# Patient Record
Sex: FEMALE | Race: BLACK OR AFRICAN AMERICAN | NOT HISPANIC OR LATINO
[De-identification: names, ages, dates, MRNs, and addresses within clinical notes are randomized per-mention and may not be internally consistent; named-entity substitution may affect disease eponyms.]

---
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
## 2019-04-04 ENCOUNTER — WALK IN (OUTPATIENT)
Dept: URGENT CARE | Age: 19
End: 2019-04-04

## 2019-04-04 VITALS
RESPIRATION RATE: 16 BRPM | HEART RATE: 60 BPM | TEMPERATURE: 97.4 F | SYSTOLIC BLOOD PRESSURE: 116 MMHG | DIASTOLIC BLOOD PRESSURE: 70 MMHG

## 2019-04-04 DIAGNOSIS — H92.03 OTALGIA OF BOTH EARS: Primary | ICD-10-CM

## 2019-04-04 PROBLEM — H92.09 EAR PAIN: Status: ACTIVE | Noted: 2019-04-04

## 2019-04-04 PROCEDURE — 99202 OFFICE O/P NEW SF 15 MIN: CPT | Performed by: NURSE PRACTITIONER

## 2019-04-04 ASSESSMENT — ENCOUNTER SYMPTOMS
CONSTITUTIONAL NEGATIVE: 1
EYES NEGATIVE: 1
RESPIRATORY NEGATIVE: 1

## 2019-06-14 PROCEDURE — 86803 HEPATITIS C AB TEST: CPT | Performed by: OBSTETRICS & GYNECOLOGY

## 2019-06-14 PROCEDURE — 87389 HIV-1 AG W/HIV-1&-2 AB AG IA: CPT | Performed by: OBSTETRICS & GYNECOLOGY

## 2020-12-02 ENCOUNTER — HOSPITAL ENCOUNTER (OUTPATIENT)
Age: 20
Discharge: HOME | End: 2020-12-02
Payer: COMMERCIAL

## 2020-12-02 VITALS
HEART RATE: 82 BPM | OXYGEN SATURATION: 100 % | TEMPERATURE: 98 F | RESPIRATION RATE: 16 BRPM | DIASTOLIC BLOOD PRESSURE: 77 MMHG | SYSTOLIC BLOOD PRESSURE: 124 MMHG

## 2020-12-02 DIAGNOSIS — Z20.822 SUSPECTED COVID-19 VIRUS INFECTION: ICD-10-CM

## 2020-12-02 DIAGNOSIS — B34.9 VIRAL SYNDROME: Primary | ICD-10-CM

## 2020-12-02 PROCEDURE — 99213 OFFICE O/P EST LOW 20 MIN: CPT | Performed by: NURSE PRACTITIONER

## 2020-12-02 PROCEDURE — 81002 URINALYSIS NONAUTO W/O SCOPE: CPT | Performed by: NURSE PRACTITIONER

## 2020-12-02 PROCEDURE — 81025 URINE PREGNANCY TEST: CPT | Performed by: NURSE PRACTITIONER

## 2020-12-02 RX ORDER — ONDANSETRON 4 MG/1
4 TABLET, ORALLY DISINTEGRATING ORAL EVERY 4 HOURS PRN
Qty: 10 TABLET | Refills: 0 | Status: SHIPPED | OUTPATIENT
Start: 2020-12-02 | End: 2020-12-09

## 2020-12-02 RX ORDER — ONDANSETRON 4 MG/1
4 TABLET, ORALLY DISINTEGRATING ORAL ONCE
Status: COMPLETED | OUTPATIENT
Start: 2020-12-02 | End: 2020-12-02

## 2020-12-02 NOTE — ED PROVIDER NOTES
Patient Seen in: [facility]      History   Patient presents with:  Nausea/Vomiting/Diarrhea    Stated Complaint: vomiting dizzy    25-year-old female presents today for congestion, sore throat body aches, dizziness nausea with 2 episodes of vo regular rhythm. Pulmonary:      Effort: Pulmonary effort is normal.      Breath sounds: Normal breath sounds. Abdominal:      General: Bowel sounds are normal. There is no distension. Palpations: Abdomen is soft. Tenderness:  There is abdomina Prescribed:  Current Discharge Medication List    START taking these medications    ondansetron 4 MG Oral Tablet Dispersible  Take 1 tablet (4 mg total) by mouth every 4 (four) hours as needed for Nausea.   Qty: 10 tablet Refills: 0

## 2020-12-02 NOTE — ED INITIAL ASSESSMENT (HPI)
Pt sts last night began with n/v, dizzy, chills, sore throat. Denies fever or diarrhea, abd pain or urinary symptoms.

## 2020-12-07 ENCOUNTER — TELEPHONE (OUTPATIENT)
Dept: BEHAVIORAL HEALTH | Age: 20
End: 2020-12-07

## 2021-01-15 ENCOUNTER — V-VISIT (OUTPATIENT)
Dept: BEHAVIORAL HEALTH | Age: 21
End: 2021-01-15

## 2021-01-15 DIAGNOSIS — F43.29 ADJUSTMENT DISORDER WITH MIXED EMOTIONAL FEATURES: Primary | ICD-10-CM

## 2021-01-15 PROCEDURE — 90791 PSYCH DIAGNOSTIC EVALUATION: CPT | Performed by: SOCIAL WORKER

## 2021-02-11 ENCOUNTER — HOSPITAL ENCOUNTER (OUTPATIENT)
Age: 21
Discharge: HOME | End: 2021-02-11
Payer: COMMERCIAL

## 2021-02-11 ENCOUNTER — APPOINTMENT (OUTPATIENT)
Dept: GENERAL RADIOLOGY | Age: 21
End: 2021-02-11
Attending: NURSE PRACTITIONER
Payer: COMMERCIAL

## 2021-02-11 VITALS
RESPIRATION RATE: 16 BRPM | HEART RATE: 72 BPM | DIASTOLIC BLOOD PRESSURE: 78 MMHG | SYSTOLIC BLOOD PRESSURE: 128 MMHG | OXYGEN SATURATION: 100 % | TEMPERATURE: 98 F

## 2021-02-11 DIAGNOSIS — Z20.822 ENCOUNTER FOR LABORATORY TESTING FOR COVID-19 VIRUS: Primary | ICD-10-CM

## 2021-02-11 DIAGNOSIS — J45.21 MILD INTERMITTENT ASTHMA WITH EXACERBATION: ICD-10-CM

## 2021-02-11 DIAGNOSIS — J06.9 VIRAL UPPER RESPIRATORY ILLNESS: ICD-10-CM

## 2021-02-11 LAB — SARS-COV-2 RNA RESP QL NAA+PROBE: NOT DETECTED

## 2021-02-11 PROCEDURE — U0002 COVID-19 LAB TEST NON-CDC: HCPCS | Performed by: NURSE PRACTITIONER

## 2021-02-11 PROCEDURE — 71046 X-RAY EXAM CHEST 2 VIEWS: CPT | Performed by: NURSE PRACTITIONER

## 2021-02-11 PROCEDURE — 99212 OFFICE O/P EST SF 10 MIN: CPT | Performed by: NURSE PRACTITIONER

## 2021-02-11 RX ORDER — PREDNISONE 20 MG/1
40 TABLET ORAL DAILY
Qty: 10 TABLET | Refills: 0 | Status: SHIPPED | OUTPATIENT
Start: 2021-02-11 | End: 2021-02-16

## 2021-02-11 NOTE — ED PROVIDER NOTES
Patient Seen in: [facility]      History   Patient presents with:  Cough/URI    Stated Complaint: cough, chest pain    HPI/Subjective:   21-year-old female presents today with complaints of URI symptoms and a persistent cough with history of s edema, congestion and rhinorrhea present. Mouth/Throat:      Mouth: Mucous membranes are moist.      Pharynx: Uvula midline. Posterior oropharyngeal erythema present.    Eyes:      Conjunctiva/sclera: Conjunctivae normal.      Pupils: Pupils are equal, pushing fluids rest.  Alternate Tylenol and Motrin for any fever pain. Encouraged take over-the-counter antihistamine and cough suppressant as needed. Will give prescription for prednisone to help with patient's asthma exacerbation.   Patient does have al

## 2021-06-22 PROBLEM — R42 DIZZINESS: Status: ACTIVE | Noted: 2021-06-22

## 2021-06-22 PROBLEM — M54.2 CERVICALGIA: Status: ACTIVE | Noted: 2021-06-22

## 2021-06-22 PROBLEM — R20.2 ARM PARESTHESIA, LEFT: Status: ACTIVE | Noted: 2021-06-22

## 2021-09-15 PROBLEM — F41.8 OTHER SPECIFIED ANXIETY DISORDERS: Status: ACTIVE | Noted: 2021-09-15

## 2021-09-15 PROBLEM — F33.1 MDD (MAJOR DEPRESSIVE DISORDER), RECURRENT EPISODE, MODERATE (HCC): Status: ACTIVE | Noted: 2021-09-15

## 2021-11-03 ENCOUNTER — APPOINTMENT (OUTPATIENT)
Dept: CT IMAGING | Age: 21
End: 2021-11-03
Attending: NURSE PRACTITIONER
Payer: COMMERCIAL

## 2021-11-03 ENCOUNTER — HOSPITAL ENCOUNTER (OUTPATIENT)
Age: 21
Discharge: HOME | End: 2021-11-03
Payer: COMMERCIAL

## 2021-11-03 VITALS
WEIGHT: 190 LBS | TEMPERATURE: 98 F | SYSTOLIC BLOOD PRESSURE: 119 MMHG | OXYGEN SATURATION: 100 % | HEART RATE: 70 BPM | RESPIRATION RATE: 18 BRPM | BODY MASS INDEX: 32.44 KG/M2 | DIASTOLIC BLOOD PRESSURE: 75 MMHG | HEIGHT: 64 IN

## 2021-11-03 DIAGNOSIS — S09.90XA INJURY OF HEAD, INITIAL ENCOUNTER: Primary | ICD-10-CM

## 2021-11-03 PROCEDURE — 99214 OFFICE O/P EST MOD 30 MIN: CPT | Performed by: NURSE PRACTITIONER

## 2021-11-03 PROCEDURE — 70450 CT HEAD/BRAIN W/O DYE: CPT | Performed by: NURSE PRACTITIONER

## 2021-11-03 PROCEDURE — 72125 CT NECK SPINE W/O DYE: CPT | Performed by: NURSE PRACTITIONER

## 2021-11-03 PROCEDURE — 81025 URINE PREGNANCY TEST: CPT | Performed by: NURSE PRACTITIONER

## 2021-11-03 RX ORDER — ONDANSETRON 4 MG/1
4 TABLET, ORALLY DISINTEGRATING ORAL ONCE
Status: COMPLETED | OUTPATIENT
Start: 2021-11-03 | End: 2021-11-03

## 2021-11-03 RX ORDER — ONDANSETRON 4 MG/1
4 TABLET, ORALLY DISINTEGRATING ORAL EVERY 4 HOURS PRN
Qty: 10 TABLET | Refills: 0 | Status: SHIPPED | OUTPATIENT
Start: 2021-11-03 | End: 2021-11-10

## 2021-11-03 RX ORDER — MECLIZINE HYDROCHLORIDE 25 MG/1
25 TABLET ORAL 3 TIMES DAILY PRN
Qty: 21 TABLET | Refills: 0 | Status: SHIPPED | OUTPATIENT
Start: 2021-11-03 | End: 2021-11-05 | Stop reason: ALTCHOICE

## 2021-11-03 NOTE — ED INITIAL ASSESSMENT (HPI)
Patient's dog tripped her on the stairs at her home. She fell back on hit her head on the wood steps- falling down about 5 steps. No LOC but has been sleepy, nauseated, and dizzy since the fall.  She has a history of 2 previous concussions- last one was abo

## 2021-11-03 NOTE — ED PROVIDER NOTES
Patient Seen in: [facility] [place]      History   Patient presents with:  Fall  Head Injury  Dizziness  Nausea    Stated Complaint: fall-head injury    Subjective:   24-year-old female complaining of nausea, dizziness, neck pain with movement ever si (36.6 °C) (Temporal)   Resp 18   Ht 162.6 cm (5' 4\")   Wt 86.2 kg   LMP 09/16/2021   SpO2 100%   BMI 32.61 kg/m²         Physical Exam  Vitals and nursing note reviewed. Constitutional:       General: She is not in acute distress.      Appearance: She is focal or neurological weakness other than what I mentioned. We did a CT of the head and C-spine which were negative. She presents with no other red flag headache symptoms. Symptoms most consistent with closed head injury with concussive-like symptoms.

## 2021-11-05 ENCOUNTER — OFFICE VISIT (OUTPATIENT)
Dept: FAMILY MEDICINE CLINIC | Facility: CLINIC | Age: 21
End: 2021-11-05
Payer: COMMERCIAL

## 2021-11-05 VITALS — OXYGEN SATURATION: 98 % | WEIGHT: 194 LBS | HEIGHT: 64 IN | RESPIRATION RATE: 18 BRPM | BODY MASS INDEX: 33.12 KG/M2

## 2021-11-05 DIAGNOSIS — Z76.89 ENCOUNTER TO ESTABLISH CARE: ICD-10-CM

## 2021-11-05 DIAGNOSIS — S06.0X0A CONCUSSION WITHOUT LOSS OF CONSCIOUSNESS, INITIAL ENCOUNTER: Primary | ICD-10-CM

## 2021-11-05 PROCEDURE — 99203 OFFICE O/P NEW LOW 30 MIN: CPT | Performed by: FAMILY MEDICINE

## 2021-11-05 PROCEDURE — 3008F BODY MASS INDEX DOCD: CPT | Performed by: FAMILY MEDICINE

## 2021-11-05 NOTE — PATIENT INSTRUCTIONS
Concussion    A concussion is a type of brain injury. It can be caused by a direct hit or blow to the head, neck, face, or body.  The force of the blow makes the head and brain shake quickly back and forth. In some cases you may lose consciousness. Depend (chronic) liver or kidney disease, talk with your healthcare provider before using these medicines. Also talk with your provider if you ever had a stomach ulcer or gastrointestinal bleeding.   · For the next 24 hours:  ? Don’t drink alcohol or take sedative information is not intended as a substitute for professional medical care. Always follow your healthcare professional's instructions.

## 2021-11-05 NOTE — PROGRESS NOTES
Patient presents with:  Concussion: Pt fell down the stairs. sx dizzy,light and sound sensitivityand blurry vision       HPI:    Patient ID: Noy Youngblood is a 24year old female. HPI   Patient here for concussion.  She fell on Wednesday and had a c Grandfather    • Diabetes Paternal Grandfather    • Hypertension Sister    • Hypertension Brother       Social History: Social History    Tobacco Use      Smoking status: Never Smoker      Smokeless tobacco: Never Used    Vaping Use      Vaping Use: Never

## 2021-11-12 ENCOUNTER — OFFICE VISIT (OUTPATIENT)
Dept: FAMILY MEDICINE CLINIC | Facility: CLINIC | Age: 21
End: 2021-11-12
Payer: COMMERCIAL

## 2021-11-12 VITALS
HEART RATE: 74 BPM | WEIGHT: 196 LBS | HEIGHT: 64 IN | OXYGEN SATURATION: 98 % | DIASTOLIC BLOOD PRESSURE: 70 MMHG | TEMPERATURE: 98 F | RESPIRATION RATE: 18 BRPM | SYSTOLIC BLOOD PRESSURE: 110 MMHG | BODY MASS INDEX: 33.46 KG/M2

## 2021-11-12 DIAGNOSIS — S06.0X0A CONCUSSION WITHOUT LOSS OF CONSCIOUSNESS, INITIAL ENCOUNTER: Primary | ICD-10-CM

## 2021-11-12 PROCEDURE — 3078F DIAST BP <80 MM HG: CPT | Performed by: FAMILY MEDICINE

## 2021-11-12 PROCEDURE — 3074F SYST BP LT 130 MM HG: CPT | Performed by: FAMILY MEDICINE

## 2021-11-12 PROCEDURE — 3008F BODY MASS INDEX DOCD: CPT | Performed by: FAMILY MEDICINE

## 2021-11-12 PROCEDURE — 99213 OFFICE O/P EST LOW 20 MIN: CPT | Performed by: FAMILY MEDICINE

## 2021-11-12 RX ORDER — MECLIZINE HYDROCHLORIDE 25 MG/1
25 TABLET ORAL 3 TIMES DAILY PRN
Qty: 30 TABLET | Refills: 0 | Status: SHIPPED | OUTPATIENT
Start: 2021-11-12 | End: 2021-11-22

## 2021-11-12 NOTE — PROGRESS NOTES
Patient presents with:  Concussion: Follow up. Headache is still there, dizzines        HPI:    Patient ID: Sindhu Ervin is a 24year old female. HPI follow up on concussion. She still has dizzines intermittent with bending and movement.  No nause takes less than 2 seconds. Neurological:      General: No focal deficit present. Mental Status: She is alert. ASSESSMENT/PLAN:   1. Concussion without loss of consciousness, initial encounter  Supportive care. Meclizine given.   Work no

## 2021-11-15 ENCOUNTER — TELEPHONE (OUTPATIENT)
Dept: FAMILY MEDICINE CLINIC | Facility: CLINIC | Age: 21
End: 2021-11-15

## 2021-11-15 DIAGNOSIS — S06.0X0A CONCUSSION WITHOUT LOSS OF CONSCIOUSNESS, INITIAL ENCOUNTER: Primary | ICD-10-CM

## 2021-11-15 NOTE — TELEPHONE ENCOUNTER
Patient reports see still feels dizzy, headahe and vision sensitiivity. Referral to neuro placed. If any other symptoms or getting worse go to ic/er.

## 2021-11-15 NOTE — TELEPHONE ENCOUNTER
----- Message from [facility] Staff sent at 11/15/2021 11:59 AM CST -----  Regarding: pt call back  Pt LVM returning call

## 2021-11-17 ENCOUNTER — TELEPHONE (OUTPATIENT)
Dept: FAMILY MEDICINE CLINIC | Facility: CLINIC | Age: 21
End: 2021-11-17

## 2021-11-17 NOTE — TELEPHONE ENCOUNTER
Received Medical Records Request from [facility] [employer]    Ph. [phone]  Fax # [phone]    Sent to [employer]

## 2021-11-22 ENCOUNTER — TELEPHONE (OUTPATIENT)
Dept: FAMILY MEDICINE CLINIC | Facility: CLINIC | Age: 21
End: 2021-11-22

## 2021-11-22 ENCOUNTER — TELEMEDICINE (OUTPATIENT)
Dept: FAMILY MEDICINE CLINIC | Facility: CLINIC | Age: 21
End: 2021-11-22

## 2021-11-22 DIAGNOSIS — S06.0X0A CONCUSSION WITHOUT LOSS OF CONSCIOUSNESS, INITIAL ENCOUNTER: Primary | ICD-10-CM

## 2021-11-22 PROCEDURE — 99213 OFFICE O/P EST LOW 20 MIN: CPT | Performed by: FAMILY MEDICINE

## 2021-11-22 NOTE — TELEPHONE ENCOUNTER
Called pt. She did not see Neuro yet. She has an appt with them on December 6th. VV scheduled.   Future Appointments   Date Time Provider Gisselle Jettisti   11/22/2021  1:40 PM Miguel Ángel Ruiz MD [facility] [place]   12/1/2021  3:00 PM Karly Adame

## 2021-11-22 NOTE — PROGRESS NOTES
No chief complaint on file. follow up  This visit is conducted using Telemedicine with live, interactive video and audio. Patient has been referred to the [facility] website at [email or web address] to review the yearly Consent to Treat document.     Karlie Appearance: She is not ill-appearing. Neurological:      Mental Status: She is alert. Mental status is at baseline. ASSESSMENT/PLAN:   1. Concussion without loss of consciousness, initial encounter  Symptoms not better with meds.  Recommend t

## 2021-11-22 NOTE — TELEPHONE ENCOUNTER
Pt is returning to work today, states she needs a return to work letter   States she needs another letter with updated dates  Pt will stop by the office to .

## 2021-12-06 NOTE — H&P
[facility] New Patient / Consult Visit    Kirsten Watson is a 24year old female.                          Referring MD: Melissa Colunga MD    Patient presents with:  Post-Concussion Syndrome: Referred by PCP, 1 mon ago, ongoing [facility] dizzy spells after her initial injury in 2017 and has never fully recovered but currently is worse than in the past.      Otherwise, patient denies any recent weight change, fevers, chills, nausea, double vision/ blurry vision / loss of vision, chest pain, Ht 64\"   Wt 196 lb (88.9 kg)   LMP 09/16/2021   Breastfeeding No   BMI 33.64 kg/m²   Estimated body mass index is 33.64 kg/m² as calculated from the following:    Height as of this encounter: 64\". Weight as of this encounter: 196 lb (88.9 kg).     GENE bilaterally  Rapid alternating movements normal bilaterally  Heel to shin is normal bilaterally    DTRs:   2+, symmetric, throughout, toes downgoing bilaterally; no clonus          Gait:    Normal casual, heel and toe gait; mildly off balance with casual a head, or rapid eye movements. In addition, she endorses episodes at least 3 times a week where she has sudden onset of worsening dizzy sensation as well as visual aura with palisading lines in her periphery followed by more intense headache and nausea.   Daren Montes reevaluate for improvement at her next visit.   She was advised that she is in the normal range for recovery from postconcussive syndrome symptoms and that symptoms usually improve within 3 months, but recovery is an individualized process, and may be influ

## 2021-12-20 ENCOUNTER — TELEPHONE (OUTPATIENT)
Dept: FAMILY MEDICINE CLINIC | Facility: CLINIC | Age: 21
End: 2021-12-20

## 2021-12-20 NOTE — TELEPHONE ENCOUNTER
Pt saw [employer] on 12/6/21. They wrote a note for pt to be off work until her f/u with them. We completed FMLA forms for her on 11/10/21 which are scanned to the chart. Will await fax from [place].

## 2021-12-20 NOTE — TELEPHONE ENCOUNTER
Hope from [facility] called said we faxed a form to them on the 15th but Dr. Gloria Puga didn't fill out the 3rd page and that is actually the page they need.   Told hope to fax them back for them to be filled

## 2021-12-23 NOTE — TELEPHONE ENCOUNTER
Pt called and VV was scheduled     Future Appointments   Date Time Provider Gisselle Oliverosi   12/27/2021  9:00 AM [facility] [place]   1/4/2022  2:20 PM Vanessa Le MD [facility] [place]   1/5/2022  3:00 PM Pollo Lindsay,

## 2021-12-29 ENCOUNTER — TELEPHONE (OUTPATIENT)
Dept: FAMILY MEDICINE CLINIC | Facility: CLINIC | Age: 21
End: 2021-12-29

## 2021-12-29 NOTE — TELEPHONE ENCOUNTER
I think she needs vv with me to discuss return date and also how she feels. Can you schedule vv for today.

## 2022-02-01 ENCOUNTER — TELEPHONE (OUTPATIENT)
Dept: FAMILY MEDICINE CLINIC | Facility: CLINIC | Age: 22
End: 2022-02-01

## 2022-02-01 NOTE — TELEPHONE ENCOUNTER
Called patient and left a voicemail to call back. We have a form from [employer]. Are question is does she still need the form to be filled out if so patient needs to come in for a appt.

## 2022-02-08 ENCOUNTER — HOSPITAL ENCOUNTER (OUTPATIENT)
Age: 22
Discharge: HOME | End: 2022-02-08
Payer: COMMERCIAL

## 2022-02-08 ENCOUNTER — APPOINTMENT (OUTPATIENT)
Dept: GENERAL RADIOLOGY | Age: 22
End: 2022-02-08
Attending: NURSE PRACTITIONER
Payer: COMMERCIAL

## 2022-02-08 VITALS
WEIGHT: 190 LBS | BODY MASS INDEX: 32.44 KG/M2 | OXYGEN SATURATION: 100 % | DIASTOLIC BLOOD PRESSURE: 77 MMHG | HEART RATE: 77 BPM | RESPIRATION RATE: 16 BRPM | HEIGHT: 64 IN | TEMPERATURE: 98 F | SYSTOLIC BLOOD PRESSURE: 114 MMHG

## 2022-02-08 DIAGNOSIS — R07.89 CHEST PAIN, ATYPICAL: Primary | ICD-10-CM

## 2022-02-08 LAB
#MXD IC: 0.5 X10ˆ3/UL (ref 0.1–1)
ATRIAL RATE: 75 BPM
BUN BLD-MCNC: 5 MG/DL (ref 7–18)
CHLORIDE BLD-SCNC: 104 MMOL/L (ref 98–112)
CO2 BLD-SCNC: 24 MMOL/L (ref 21–32)
DDIMER WHOLE BLOOD: 362 NG/ML DDU (ref ?–400)
GLUCOSE BLD-MCNC: 72 MG/DL (ref 70–99)
HCT VFR BLD AUTO: 36.1 %
HCT VFR BLD CALC: 37 %
HGB BLD-MCNC: 11.8 G/DL
ISTAT IONIZED CALCIUM FOR CHEM 8: 1.28 MMOL/L (ref 1.12–1.32)
LYMPHOCYTES # BLD AUTO: 2.1 X10ˆ3/UL (ref 1–4)
LYMPHOCYTES NFR BLD AUTO: 38.5 %
MCH RBC QN AUTO: 25.9 PG (ref 26–34)
MCV RBC AUTO: 79.3 FL (ref 80–100)
MIXED CELL %: 9.2 %
NEUTROPHILS # BLD AUTO: 2.8 X10ˆ3/UL (ref 1.5–7.7)
NEUTROPHILS NFR BLD AUTO: 52.3 %
P AXIS: 54 DEGREES
P-R INTERVAL: 174 MS
PLATELET # BLD AUTO: 385 X10ˆ3/UL (ref 150–450)
POTASSIUM BLD-SCNC: 3.5 MMOL/L (ref 3.6–5.1)
Q-T INTERVAL: 370 MS
QRS DURATION: 80 MS
QTC CALCULATION (BEZET): 413 MS
RBC # BLD AUTO: 4.55 X10ˆ6/UL
SODIUM BLD-SCNC: 141 MMOL/L (ref 136–145)
T AXIS: 57 DEGREES
TROPONIN I BLD-MCNC: <0.02 NG/ML
VENTRICULAR RATE: 75 BPM
WBC # BLD AUTO: 5.4 X10ˆ3/UL (ref 4–11)

## 2022-02-08 PROCEDURE — 80047 BASIC METABLC PNL IONIZED CA: CPT | Performed by: NURSE PRACTITIONER

## 2022-02-08 PROCEDURE — 71046 X-RAY EXAM CHEST 2 VIEWS: CPT | Performed by: NURSE PRACTITIONER

## 2022-02-08 PROCEDURE — 99214 OFFICE O/P EST MOD 30 MIN: CPT | Performed by: NURSE PRACTITIONER

## 2022-02-08 PROCEDURE — 85378 FIBRIN DEGRADE SEMIQUANT: CPT | Performed by: NURSE PRACTITIONER

## 2022-02-08 PROCEDURE — 84484 ASSAY OF TROPONIN QUANT: CPT | Performed by: NURSE PRACTITIONER

## 2022-02-08 PROCEDURE — 85025 COMPLETE CBC W/AUTO DIFF WBC: CPT | Performed by: NURSE PRACTITIONER

## 2022-02-08 PROCEDURE — 93000 ELECTROCARDIOGRAM COMPLETE: CPT | Performed by: NURSE PRACTITIONER

## 2024-03-21 ENCOUNTER — APPOINTMENT (OUTPATIENT)
Dept: GASTROENTEROLOGY | Age: 24
End: 2024-03-21

## 2024-03-21 ENCOUNTER — LAB SERVICES (OUTPATIENT)
Dept: LAB | Age: 24
End: 2024-03-21

## 2024-03-21 VITALS
HEART RATE: 81 BPM | HEIGHT: 64 IN | BODY MASS INDEX: 27.36 KG/M2 | SYSTOLIC BLOOD PRESSURE: 118 MMHG | TEMPERATURE: 96.8 F | WEIGHT: 160.27 LBS | RESPIRATION RATE: 16 BRPM | DIASTOLIC BLOOD PRESSURE: 77 MMHG | OXYGEN SATURATION: 92 %

## 2024-03-21 DIAGNOSIS — R10.814 LEFT LOWER QUADRANT ABDOMINAL TENDERNESS WITHOUT REBOUND TENDERNESS: ICD-10-CM

## 2024-03-21 DIAGNOSIS — K59.09 CHRONIC CONSTIPATION: ICD-10-CM

## 2024-03-21 DIAGNOSIS — R63.4 UNINTENTIONAL WEIGHT LOSS: ICD-10-CM

## 2024-03-21 DIAGNOSIS — R11.2 NAUSEA AND VOMITING, UNSPECIFIED VOMITING TYPE: ICD-10-CM

## 2024-03-21 DIAGNOSIS — R63.4 UNINTENTIONAL WEIGHT LOSS: Primary | ICD-10-CM

## 2024-03-21 LAB
ALBUMIN SERPL-MCNC: 4 G/DL (ref 3.6–5.1)
ALBUMIN/GLOB SERPL: 1 {RATIO} (ref 1–2.4)
ALP SERPL-CCNC: 56 UNITS/L (ref 45–117)
ALT SERPL-CCNC: 16 UNITS/L
ANION GAP SERPL CALC-SCNC: 14 MMOL/L (ref 7–19)
AST SERPL-CCNC: 14 UNITS/L
BILIRUB SERPL-MCNC: 0.6 MG/DL (ref 0.2–1)
BUN SERPL-MCNC: 11 MG/DL (ref 6–20)
BUN/CREAT SERPL: 14 (ref 7–25)
CALCIUM SERPL-MCNC: 8.8 MG/DL (ref 8.4–10.2)
CHLORIDE SERPL-SCNC: 104 MMOL/L (ref 97–110)
CO2 SERPL-SCNC: 26 MMOL/L (ref 21–32)
CREAT SERPL-MCNC: 0.81 MG/DL (ref 0.51–0.95)
DEPRECATED RDW RBC: 40.7 FL (ref 39–50)
EGFRCR SERPLBLD CKD-EPI 2021: >90 ML/MIN/{1.73_M2}
ERYTHROCYTE [DISTWIDTH] IN BLOOD: 13.7 % (ref 11–15)
FASTING DURATION TIME PATIENT: NORMAL H
GLOBULIN SER-MCNC: 4 G/DL (ref 2–4)
GLUCOSE SERPL-MCNC: 79 MG/DL (ref 70–99)
HBA1C MFR BLD: 5.1 % (ref 4.5–5.6)
HCT VFR BLD CALC: 38.8 % (ref 36–46.5)
HGB BLD-MCNC: 12.9 G/DL (ref 12–15.5)
MCH RBC QN AUTO: 27.2 PG (ref 26–34)
MCHC RBC AUTO-ENTMCNC: 33.2 G/DL (ref 32–36.5)
MCV RBC AUTO: 81.9 FL (ref 78–100)
NRBC BLD MANUAL-RTO: 0 /100 WBC
PLATELET # BLD AUTO: 389 K/MCL (ref 140–450)
POTASSIUM SERPL-SCNC: 3.8 MMOL/L (ref 3.4–5.1)
PROT SERPL-MCNC: 8 G/DL (ref 6.4–8.2)
RBC # BLD: 4.74 MIL/MCL (ref 4–5.2)
SODIUM SERPL-SCNC: 140 MMOL/L (ref 135–145)
TSH SERPL-ACNC: 0.93 MCUNITS/ML (ref 0.35–5)
WBC # BLD: 4.7 K/MCL (ref 4.2–11)

## 2024-03-21 PROCEDURE — 85027 COMPLETE CBC AUTOMATED: CPT | Performed by: INTERNAL MEDICINE

## 2024-03-21 PROCEDURE — 84443 ASSAY THYROID STIM HORMONE: CPT | Performed by: INTERNAL MEDICINE

## 2024-03-21 PROCEDURE — 36415 COLL VENOUS BLD VENIPUNCTURE: CPT | Performed by: NURSE PRACTITIONER

## 2024-03-21 PROCEDURE — 80053 COMPREHEN METABOLIC PANEL: CPT | Performed by: INTERNAL MEDICINE

## 2024-03-21 PROCEDURE — 83036 HEMOGLOBIN GLYCOSYLATED A1C: CPT | Performed by: CLINICAL MEDICAL LABORATORY

## 2024-03-21 RX ORDER — BISACODYL 5 MG/1
TABLET, DELAYED RELEASE ORAL
Qty: 2 TABLET | Refills: 0 | Status: SHIPPED | OUTPATIENT
Start: 2024-03-21

## 2024-03-21 RX ORDER — ONDANSETRON 4 MG/1
4 TABLET, FILM COATED ORAL EVERY 8 HOURS PRN
Qty: 30 TABLET | Refills: 0 | Status: SHIPPED | OUTPATIENT
Start: 2024-03-21 | End: 2024-03-31

## 2024-03-21 ASSESSMENT — ENCOUNTER SYMPTOMS
CONSTIPATION: 1
RECTAL PAIN: 0
PSYCHIATRIC NEGATIVE: 1
CHEST TIGHTNESS: 0
CHOKING: 0
BLOOD IN STOOL: 0
COUGH: 0
ABDOMINAL DISTENTION: 0
ANAL BLEEDING: 0
ABDOMINAL PAIN: 0
DIARRHEA: 0
NAUSEA: 1
SHORTNESS OF BREATH: 0
SORE THROAT: 0
TROUBLE SWALLOWING: 0
UNEXPECTED WEIGHT CHANGE: 1
VOMITING: 1
APPETITE CHANGE: 0
VOICE CHANGE: 0

## 2024-03-30 ENCOUNTER — APPOINTMENT (OUTPATIENT)
Dept: CT IMAGING | Age: 24
End: 2024-03-30
Attending: NURSE PRACTITIONER

## 2024-04-02 ENCOUNTER — TELEPHONE (OUTPATIENT)
Dept: GASTROENTEROLOGY | Age: 24
End: 2024-04-02

## 2024-04-02 ENCOUNTER — E-ADVICE (OUTPATIENT)
Dept: GASTROENTEROLOGY | Age: 24
End: 2024-04-02

## 2024-04-09 ENCOUNTER — APPOINTMENT (OUTPATIENT)
Dept: GASTROENTEROLOGY | Age: 24
End: 2024-04-09

## 2024-04-09 ENCOUNTER — ANESTHESIA EVENT (OUTPATIENT)
Dept: GASTROENTEROLOGY | Age: 24
End: 2024-04-09

## 2024-04-09 ENCOUNTER — ANESTHESIA (OUTPATIENT)
Dept: GASTROENTEROLOGY | Age: 24
End: 2024-04-09

## 2024-04-09 VITALS
DIASTOLIC BLOOD PRESSURE: 84 MMHG | WEIGHT: 160 LBS | TEMPERATURE: 97.3 F | HEIGHT: 64 IN | HEART RATE: 84 BPM | RESPIRATION RATE: 16 BRPM | SYSTOLIC BLOOD PRESSURE: 120 MMHG | OXYGEN SATURATION: 100 % | BODY MASS INDEX: 27.31 KG/M2

## 2024-04-09 DIAGNOSIS — R10.814 LEFT LOWER QUADRANT ABDOMINAL TENDERNESS WITHOUT REBOUND TENDERNESS: ICD-10-CM

## 2024-04-09 DIAGNOSIS — R11.2 NAUSEA AND VOMITING, UNSPECIFIED VOMITING TYPE: ICD-10-CM

## 2024-04-09 DIAGNOSIS — K59.09 CHRONIC CONSTIPATION: ICD-10-CM

## 2024-04-09 DIAGNOSIS — R63.4 UNINTENTIONAL WEIGHT LOSS: Primary | ICD-10-CM

## 2024-04-09 DIAGNOSIS — R20.0 ANESTHESIA: ICD-10-CM

## 2024-04-09 LAB
B-HCG UR QL: NEGATIVE
INTERNAL PROCEDURAL CONTROLS ACCEPTABLE: YES
TEST LOT EXPIRATION DATE: NORMAL
TEST LOT NUMBER: NORMAL

## 2024-04-09 RX ORDER — PROPOFOL 10 MG/ML
INJECTION, EMULSION INTRAVENOUS PRN
Status: DISCONTINUED | OUTPATIENT
Start: 2024-04-09 | End: 2024-04-09

## 2024-04-09 RX ORDER — SODIUM CHLORIDE, SODIUM LACTATE, POTASSIUM CHLORIDE, CALCIUM CHLORIDE 600; 310; 30; 20 MG/100ML; MG/100ML; MG/100ML; MG/100ML
INJECTION, SOLUTION INTRAVENOUS CONTINUOUS PRN
Status: DISCONTINUED | OUTPATIENT
Start: 2024-04-09 | End: 2024-04-09

## 2024-04-09 RX ORDER — ONDANSETRON 2 MG/ML
INJECTION INTRAMUSCULAR; INTRAVENOUS PRN
Status: DISCONTINUED | OUTPATIENT
Start: 2024-04-09 | End: 2024-04-09

## 2024-04-09 RX ORDER — ONDANSETRON 4 MG/1
4 TABLET, FILM COATED ORAL EVERY 8 HOURS PRN
COMMUNITY

## 2024-04-09 RX ADMIN — PROPOFOL 150 MG: 10 INJECTION, EMULSION INTRAVENOUS at 08:12

## 2024-04-09 RX ADMIN — ONDANSETRON 4 MG: 2 INJECTION INTRAMUSCULAR; INTRAVENOUS at 08:14

## 2024-04-09 RX ADMIN — SODIUM CHLORIDE, SODIUM LACTATE, POTASSIUM CHLORIDE, CALCIUM CHLORIDE: 600; 310; 30; 20 INJECTION, SOLUTION INTRAVENOUS at 08:06

## 2024-04-09 RX ADMIN — PROPOFOL 30 MG: 10 INJECTION, EMULSION INTRAVENOUS at 08:16

## 2024-04-09 ASSESSMENT — PAIN SCALES - GENERAL
PAINLEVEL: 0

## 2024-04-11 LAB
ASR DISCLAIMER: NORMAL
CASE RPRT: NORMAL
CLINICAL INFO: NORMAL
PATH REPORT.FINAL DX SPEC: NORMAL
PATH REPORT.FINAL DX SPEC: NORMAL
PATH REPORT.GROSS SPEC: NORMAL

## 2024-04-12 ENCOUNTER — TELEPHONE (OUTPATIENT)
Dept: GASTROENTEROLOGY | Age: 24
End: 2024-04-12

## 2024-04-15 LAB
ASR DISCLAIMER: NORMAL
CASE RPRT: NORMAL
CLINICAL INFO: NORMAL
IHC PROGNOSTIC MARKER RESULT, ADDENDUM: NORMAL
PATH REPORT.FINAL DX SPEC: NORMAL
PATH REPORT.FINAL DX SPEC: NORMAL
PATH REPORT.GROSS SPEC: NORMAL

## 2024-05-04 ENCOUNTER — APPOINTMENT (OUTPATIENT)
Dept: OBGYN | Age: 24
End: 2024-05-04

## 2024-05-04 ENCOUNTER — LAB SERVICES (OUTPATIENT)
Dept: LAB | Age: 24
End: 2024-05-04

## 2024-05-04 VITALS
HEART RATE: 63 BPM | HEIGHT: 64 IN | WEIGHT: 157.08 LBS | SYSTOLIC BLOOD PRESSURE: 112 MMHG | DIASTOLIC BLOOD PRESSURE: 72 MMHG | BODY MASS INDEX: 26.82 KG/M2

## 2024-05-04 DIAGNOSIS — Z11.3 ROUTINE SCREENING FOR STI (SEXUALLY TRANSMITTED INFECTION): ICD-10-CM

## 2024-05-04 DIAGNOSIS — Z01.419 WELL WOMAN EXAM WITH ROUTINE GYNECOLOGICAL EXAM: Primary | ICD-10-CM

## 2024-05-04 DIAGNOSIS — N89.8 VAGINAL ITCHING: ICD-10-CM

## 2024-05-04 DIAGNOSIS — Z12.4 PAPANICOLAOU SMEAR FOR CERVICAL CANCER SCREENING: ICD-10-CM

## 2024-05-04 PROCEDURE — 36415 COLL VENOUS BLD VENIPUNCTURE: CPT | Performed by: NURSE PRACTITIONER

## 2024-05-04 PROCEDURE — 80074 ACUTE HEPATITIS PANEL: CPT | Performed by: CLINICAL MEDICAL LABORATORY

## 2024-05-04 PROCEDURE — 87389 HIV-1 AG W/HIV-1&-2 AB AG IA: CPT | Performed by: CLINICAL MEDICAL LABORATORY

## 2024-05-04 PROCEDURE — 86592 SYPHILIS TEST NON-TREP QUAL: CPT | Performed by: CLINICAL MEDICAL LABORATORY

## 2024-05-04 RX ORDER — FLUCONAZOLE 150 MG/1
TABLET ORAL
Qty: 2 TABLET | Refills: 0 | Status: SHIPPED | OUTPATIENT
Start: 2024-05-04

## 2024-05-04 ASSESSMENT — PATIENT HEALTH QUESTIONNAIRE - PHQ9
CLINICAL INTERPRETATION OF PHQ2 SCORE: NO FURTHER SCREENING NEEDED
2. FEELING DOWN, DEPRESSED OR HOPELESS: NOT AT ALL
SUM OF ALL RESPONSES TO PHQ9 QUESTIONS 1 AND 2: 0
SUM OF ALL RESPONSES TO PHQ9 QUESTIONS 1 AND 2: 0
1. LITTLE INTEREST OR PLEASURE IN DOING THINGS: NOT AT ALL

## 2024-05-04 ASSESSMENT — PAIN SCALES - GENERAL: PAINLEVEL: 0

## 2024-05-05 LAB
ANNOTATION COMMENT IMP: NORMAL
HAV IGM SER QL: NEGATIVE
HBV CORE IGM SER QL: NEGATIVE
HBV SURFACE AG SER QL: NEGATIVE
HCV AB SER QL: NEGATIVE
HIV 1+2 AB+HIV1 P24 AG SERPL QL IA: NONREACTIVE
IMP & REVIEW OF LAB RESULTS: NORMAL
RPR SER QL: NONREACTIVE

## 2024-05-06 LAB
BACTERIAL VAGINOSIS VAG-IMP: POSITIVE
C ALBICANS DNA VAG QL NAA+PROBE: POSITIVE
C GLABRATA DNA VAG QL NAA+PROBE: NOT DETECTED
SERVICE CMNT-IMP: ABNORMAL
SERVICE CMNT-IMP: ABNORMAL
T VAGINALIS DNA VAG QL NAA+PROBE: NOT DETECTED

## 2024-05-07 LAB
C TRACH RRNA CVX QL NAA+PROBE: NEGATIVE
Lab: NORMAL
Lab: NORMAL
N GONORRHOEA RRNA CVX QL NAA+PROBE: NEGATIVE
T VAGINALIS RRNA CVX QL NAA+PROBE: NEGATIVE

## 2024-05-08 LAB — HOLD SPECIMEN: NORMAL

## 2024-05-14 LAB
CASE RPRT: NORMAL
CYTOLOGY CVX/VAG STUDY: NORMAL
CYTOLOGY CVX/VAG STUDY: NORMAL
PAP EDUCATIONAL NOTE: NORMAL
PATH REPORT.ADDENDUM SPEC: NORMAL
SPECIMEN ADEQUACY: NORMAL

## 2024-05-23 ENCOUNTER — APPOINTMENT (OUTPATIENT)
Dept: FAMILY MEDICINE | Age: 24
End: 2024-05-23

## 2024-06-06 ENCOUNTER — APPOINTMENT (OUTPATIENT)
Dept: FAMILY MEDICINE | Age: 24
End: 2024-06-06

## 2024-06-06 VITALS
BODY MASS INDEX: 27.31 KG/M2 | HEART RATE: 63 BPM | WEIGHT: 160 LBS | DIASTOLIC BLOOD PRESSURE: 79 MMHG | HEIGHT: 64 IN | OXYGEN SATURATION: 97 % | RESPIRATION RATE: 18 BRPM | TEMPERATURE: 97.6 F | SYSTOLIC BLOOD PRESSURE: 123 MMHG

## 2024-06-06 DIAGNOSIS — K29.70 GASTRITIS WITHOUT BLEEDING, UNSPECIFIED CHRONICITY, UNSPECIFIED GASTRITIS TYPE: ICD-10-CM

## 2024-06-06 DIAGNOSIS — R10.10 PAIN OF UPPER ABDOMEN: ICD-10-CM

## 2024-06-06 DIAGNOSIS — M25.50 MULTIPLE JOINT PAIN: ICD-10-CM

## 2024-06-06 DIAGNOSIS — R11.2 NAUSEA AND VOMITING, UNSPECIFIED VOMITING TYPE: Primary | ICD-10-CM

## 2024-06-06 PROBLEM — R10.11 RIGHT UPPER QUADRANT ABDOMINAL PAIN: Status: ACTIVE | Noted: 2024-06-06

## 2024-06-06 PROBLEM — K29.50 CHRONIC GASTRITIS WITHOUT BLEEDING: Status: ACTIVE | Noted: 2024-06-06

## 2024-06-06 RX ORDER — OMEPRAZOLE 20 MG/1
20 CAPSULE, DELAYED RELEASE ORAL 2 TIMES DAILY
Qty: 90 CAPSULE | Refills: 3 | Status: SHIPPED | OUTPATIENT
Start: 2024-06-06 | End: 2024-06-08 | Stop reason: SDUPTHER

## 2024-06-06 RX ORDER — METOCLOPRAMIDE 5 MG/1
5 TABLET ORAL 3 TIMES DAILY PRN
Qty: 90 TABLET | Refills: 0 | Status: SHIPPED | OUTPATIENT
Start: 2024-06-06 | End: 2024-06-11 | Stop reason: SDUPTHER

## 2024-06-06 ASSESSMENT — PAIN SCALES - GENERAL: PAINLEVEL: 0

## 2024-06-06 ASSESSMENT — PATIENT HEALTH QUESTIONNAIRE - PHQ9
SUM OF ALL RESPONSES TO PHQ9 QUESTIONS 1 AND 2: 0
2. FEELING DOWN, DEPRESSED OR HOPELESS: NOT AT ALL
1. LITTLE INTEREST OR PLEASURE IN DOING THINGS: NOT AT ALL
CLINICAL INTERPRETATION OF PHQ2 SCORE: NO FURTHER SCREENING NEEDED
SUM OF ALL RESPONSES TO PHQ9 QUESTIONS 1 AND 2: 0

## 2024-06-07 ENCOUNTER — TELEPHONE (OUTPATIENT)
Dept: GASTROENTEROLOGY | Age: 24
End: 2024-06-07

## 2024-06-08 ENCOUNTER — LAB SERVICES (OUTPATIENT)
Dept: LAB | Age: 24
End: 2024-06-08

## 2024-06-08 DIAGNOSIS — R11.2 NAUSEA AND VOMITING, UNSPECIFIED VOMITING TYPE: ICD-10-CM

## 2024-06-08 DIAGNOSIS — K29.70 GASTRITIS WITHOUT BLEEDING, UNSPECIFIED CHRONICITY, UNSPECIFIED GASTRITIS TYPE: ICD-10-CM

## 2024-06-08 DIAGNOSIS — N89.8 VAGINAL ITCHING: ICD-10-CM

## 2024-06-08 DIAGNOSIS — M25.50 MULTIPLE JOINT PAIN: ICD-10-CM

## 2024-06-08 LAB
ALBUMIN SERPL-MCNC: 3.8 G/DL (ref 3.6–5.1)
ALBUMIN/GLOB SERPL: 1.2 {RATIO} (ref 1–2.4)
ALP SERPL-CCNC: 50 UNITS/L (ref 45–117)
ALT SERPL-CCNC: 13 UNITS/L
ANION GAP SERPL CALC-SCNC: 9 MMOL/L (ref 7–19)
AST SERPL-CCNC: 12 UNITS/L
BASOPHILS # BLD: 0 K/MCL (ref 0–0.3)
BASOPHILS NFR BLD: 1 %
BILIRUB SERPL-MCNC: 0.5 MG/DL (ref 0.2–1)
BUN SERPL-MCNC: 11 MG/DL (ref 6–20)
BUN/CREAT SERPL: 10 (ref 7–25)
CALCIUM SERPL-MCNC: 9 MG/DL (ref 8.4–10.2)
CHLORIDE SERPL-SCNC: 111 MMOL/L (ref 97–110)
CO2 SERPL-SCNC: 24 MMOL/L (ref 21–32)
CREAT SERPL-MCNC: 1.06 MG/DL (ref 0.51–0.95)
CRP SERPL-MCNC: <3 MG/L
DEPRECATED RDW RBC: 41.4 FL (ref 39–50)
EGFRCR SERPLBLD CKD-EPI 2021: 75 ML/MIN/{1.73_M2}
EOSINOPHIL # BLD: 0.1 K/MCL (ref 0–0.5)
EOSINOPHIL NFR BLD: 2 %
ERYTHROCYTE [DISTWIDTH] IN BLOOD: 14 % (ref 11–15)
ERYTHROCYTE [SEDIMENTATION RATE] IN BLOOD BY WESTERGREN METHOD: 10 MM/HR (ref 0–20)
FASTING DURATION TIME PATIENT: ABNORMAL H
GLOBULIN SER-MCNC: 3.3 G/DL (ref 2–4)
GLUCOSE SERPL-MCNC: 81 MG/DL (ref 70–99)
HCT VFR BLD CALC: 36.4 % (ref 36–46.5)
HGB BLD-MCNC: 12.2 G/DL (ref 12–15.5)
IMM GRANULOCYTES # BLD AUTO: 0 K/MCL (ref 0–0.2)
IMM GRANULOCYTES # BLD: 0 %
LYMPHOCYTES # BLD: 1.5 K/MCL (ref 1–4.8)
LYMPHOCYTES NFR BLD: 43 %
MCH RBC QN AUTO: 27.5 PG (ref 26–34)
MCHC RBC AUTO-ENTMCNC: 33.5 G/DL (ref 32–36.5)
MCV RBC AUTO: 82.2 FL (ref 78–100)
MONOCYTES # BLD: 0.5 K/MCL (ref 0.3–0.9)
MONOCYTES NFR BLD: 13 %
NEUTROPHILS # BLD: 1.5 K/MCL (ref 1.8–7.7)
NEUTROPHILS NFR BLD: 41 %
NRBC BLD MANUAL-RTO: 0 /100 WBC
PLATELET # BLD AUTO: 437 K/MCL (ref 140–450)
POTASSIUM SERPL-SCNC: 3.9 MMOL/L (ref 3.4–5.1)
PROT SERPL-MCNC: 7.1 G/DL (ref 6.4–8.2)
RBC # BLD: 4.43 MIL/MCL (ref 4–5.2)
RHEUMATOID FACT SER NEPH-ACNC: <10 UNITS/ML
SODIUM SERPL-SCNC: 140 MMOL/L (ref 135–145)
WBC # BLD: 3.5 K/MCL (ref 4.2–11)

## 2024-06-08 PROCEDURE — 86431 RHEUMATOID FACTOR QUANT: CPT | Performed by: CLINICAL MEDICAL LABORATORY

## 2024-06-08 PROCEDURE — 36415 COLL VENOUS BLD VENIPUNCTURE: CPT | Performed by: NURSE PRACTITIONER

## 2024-06-08 PROCEDURE — 85025 COMPLETE CBC W/AUTO DIFF WBC: CPT | Performed by: CLINICAL MEDICAL LABORATORY

## 2024-06-08 PROCEDURE — 80053 COMPREHEN METABOLIC PANEL: CPT | Performed by: CLINICAL MEDICAL LABORATORY

## 2024-06-08 PROCEDURE — 86140 C-REACTIVE PROTEIN: CPT | Performed by: CLINICAL MEDICAL LABORATORY

## 2024-06-08 PROCEDURE — 85652 RBC SED RATE AUTOMATED: CPT | Performed by: CLINICAL MEDICAL LABORATORY

## 2024-06-08 RX ORDER — OMEPRAZOLE 20 MG/1
20 CAPSULE, DELAYED RELEASE ORAL 2 TIMES DAILY
Qty: 90 CAPSULE | Refills: 3 | Status: SHIPPED | OUTPATIENT
Start: 2024-06-08

## 2024-06-11 ENCOUNTER — E-ADVICE (OUTPATIENT)
Dept: OBGYN | Age: 24
End: 2024-06-11

## 2024-06-11 DIAGNOSIS — N89.8 VAGINAL ITCHING: Primary | ICD-10-CM

## 2024-06-11 DIAGNOSIS — R11.2 NAUSEA AND VOMITING, UNSPECIFIED VOMITING TYPE: ICD-10-CM

## 2024-06-13 ENCOUNTER — APPOINTMENT (OUTPATIENT)
Dept: FAMILY MEDICINE | Age: 24
End: 2024-06-13

## 2024-06-14 ASSESSMENT — ENCOUNTER SYMPTOMS
CONSTITUTIONAL NEGATIVE: 1
HEMATOLOGIC/LYMPHATIC NEGATIVE: 1
EYES NEGATIVE: 1
ENDOCRINE NEGATIVE: 1
NEUROLOGICAL NEGATIVE: 1
ABDOMINAL PAIN: 1
RESPIRATORY NEGATIVE: 1
PSYCHIATRIC NEGATIVE: 1

## 2024-06-15 RX ORDER — METOCLOPRAMIDE 5 MG/1
5 TABLET ORAL 3 TIMES DAILY PRN
Qty: 90 TABLET | Refills: 0 | Status: SHIPPED | OUTPATIENT
Start: 2024-06-15

## 2024-06-17 RX ORDER — FLUCONAZOLE 150 MG/1
TABLET ORAL
Qty: 2 TABLET | Refills: 0 | OUTPATIENT
Start: 2024-06-17

## 2024-06-20 ENCOUNTER — TELEPHONE (OUTPATIENT)
Dept: GASTROENTEROLOGY | Age: 24
End: 2024-06-20

## 2024-06-20 ENCOUNTER — OFFICE VISIT (OUTPATIENT)
Dept: FAMILY MEDICINE | Age: 24
End: 2024-06-20

## 2024-06-20 VITALS
WEIGHT: 159 LBS | BODY MASS INDEX: 27.14 KG/M2 | DIASTOLIC BLOOD PRESSURE: 69 MMHG | HEIGHT: 64 IN | OXYGEN SATURATION: 100 % | RESPIRATION RATE: 18 BRPM | HEART RATE: 78 BPM | SYSTOLIC BLOOD PRESSURE: 105 MMHG | TEMPERATURE: 97.8 F

## 2024-06-20 DIAGNOSIS — K29.50 CHRONIC GASTRITIS WITHOUT BLEEDING, UNSPECIFIED GASTRITIS TYPE: Primary | ICD-10-CM

## 2024-06-20 PROCEDURE — 99213 OFFICE O/P EST LOW 20 MIN: CPT | Performed by: NURSE PRACTITIONER

## 2024-06-20 ASSESSMENT — ENCOUNTER SYMPTOMS
ABDOMINAL PAIN: 1
PSYCHIATRIC NEGATIVE: 1
VOMITING: 0
ENDOCRINE NEGATIVE: 1
CONSTITUTIONAL NEGATIVE: 1
RESPIRATORY NEGATIVE: 1
HEMATOLOGIC/LYMPHATIC NEGATIVE: 1
NEUROLOGICAL NEGATIVE: 1
NAUSEA: 0
EYES NEGATIVE: 1

## 2024-06-20 ASSESSMENT — PATIENT HEALTH QUESTIONNAIRE - PHQ9
2. FEELING DOWN, DEPRESSED OR HOPELESS: NOT AT ALL
1. LITTLE INTEREST OR PLEASURE IN DOING THINGS: NOT AT ALL
SUM OF ALL RESPONSES TO PHQ9 QUESTIONS 1 AND 2: 0
CLINICAL INTERPRETATION OF PHQ2 SCORE: NO FURTHER SCREENING NEEDED
SUM OF ALL RESPONSES TO PHQ9 QUESTIONS 1 AND 2: 0

## 2024-06-20 ASSESSMENT — PAIN SCALES - GENERAL: PAINLEVEL: 0

## 2024-06-24 ENCOUNTER — E-ADVICE (OUTPATIENT)
Dept: GASTROENTEROLOGY | Age: 24
End: 2024-06-24

## 2024-07-11 ENCOUNTER — TELEPHONE (OUTPATIENT)
Dept: GASTROENTEROLOGY | Age: 24
End: 2024-07-11

## 2024-07-12 ENCOUNTER — APPOINTMENT (OUTPATIENT)
Dept: GASTROENTEROLOGY | Age: 24
End: 2024-07-12

## 2024-07-12 VITALS
WEIGHT: 160.27 LBS | RESPIRATION RATE: 16 BRPM | BODY MASS INDEX: 27.36 KG/M2 | TEMPERATURE: 97.7 F | OXYGEN SATURATION: 95 % | HEART RATE: 72 BPM | HEIGHT: 64 IN

## 2024-07-12 DIAGNOSIS — K59.09 CHRONIC CONSTIPATION: ICD-10-CM

## 2024-07-12 DIAGNOSIS — R10.31 BILATERAL LOWER ABDOMINAL PAIN: ICD-10-CM

## 2024-07-12 DIAGNOSIS — R10.32 BILATERAL LOWER ABDOMINAL PAIN: ICD-10-CM

## 2024-07-12 DIAGNOSIS — R14.0 ABDOMINAL BLOATING: ICD-10-CM

## 2024-07-12 DIAGNOSIS — R11.0 NAUSEA WITHOUT VOMITING: ICD-10-CM

## 2024-07-12 DIAGNOSIS — R10.13 EPIGASTRIC PAIN: Primary | ICD-10-CM

## 2024-07-12 DIAGNOSIS — K21.9 GERD WITHOUT ESOPHAGITIS: ICD-10-CM

## 2024-07-12 DIAGNOSIS — F12.90 MARIJUANA USER: ICD-10-CM

## 2024-07-12 RX ORDER — POLYETHYLENE GLYCOL 3350 17 G/17G
17 POWDER, FOR SOLUTION ORAL DAILY
Qty: 238 G | Refills: 3 | Status: SHIPPED | OUTPATIENT
Start: 2024-07-12

## 2024-07-12 RX ORDER — SUCRALFATE 1 G/1
1 TABLET ORAL 4 TIMES DAILY
Qty: 120 TABLET | Refills: 1 | Status: SHIPPED | OUTPATIENT
Start: 2024-07-12

## 2024-07-12 ASSESSMENT — ENCOUNTER SYMPTOMS
SORE THROAT: 0
CONSTIPATION: 1
SHORTNESS OF BREATH: 0
PSYCHIATRIC NEGATIVE: 1
VOICE CHANGE: 0
UNEXPECTED WEIGHT CHANGE: 0
RECTAL PAIN: 0
ANAL BLEEDING: 0
VOMITING: 0
DIARRHEA: 0
ABDOMINAL DISTENTION: 0
TROUBLE SWALLOWING: 0
ABDOMINAL PAIN: 1
CHEST TIGHTNESS: 0
CHOKING: 0
BLOOD IN STOOL: 0
NAUSEA: 1
COUGH: 0
APPETITE CHANGE: 0

## 2024-07-27 ENCOUNTER — HOSPITAL ENCOUNTER (OUTPATIENT)
Dept: GENERAL RADIOLOGY | Age: 24
Discharge: HOME | End: 2024-07-27
Attending: NURSE PRACTITIONER

## 2024-07-27 DIAGNOSIS — R10.32 BILATERAL LOWER ABDOMINAL PAIN: ICD-10-CM

## 2024-07-27 DIAGNOSIS — R10.31 BILATERAL LOWER ABDOMINAL PAIN: ICD-10-CM

## 2024-07-27 DIAGNOSIS — R14.0 ABDOMINAL BLOATING: ICD-10-CM

## 2024-07-27 DIAGNOSIS — K59.09 CHRONIC CONSTIPATION: ICD-10-CM

## 2024-07-27 PROCEDURE — 74018 RADEX ABDOMEN 1 VIEW: CPT

## 2024-08-06 ASSESSMENT — ENCOUNTER SYMPTOMS
SHORTNESS OF BREATH: 0
DIARRHEA: 0
HEADACHES: 0
WEIGHT LOSS: 0
COUGH: 0
CHILLS: 0
NAUSEA: 0
SINUS PAIN: 0
CONSTIPATION: 0
BACK PAIN: 0
ABDOMINAL PAIN: 0
VOMITING: 0
WEAKNESS: 0
EYE REDNESS: 0
FEVER: 0
EYE PAIN: 0
HEARTBURN: 0
SORE THROAT: 0

## 2024-08-06 ASSESSMENT — RHEUMATOLOGY NEW PATIENT QUESTIONNAIRE
NIGHT SWEATS: N
EYE PAIN: N
EYE REDNESS: N
DEPRESSION: N
INCREASED SUSCEPTIBILITY TO INFECTION: N
SWOLLEN OR TENDER GLANDS: N
BLOOD IN STOOLS: N
EASY BRUISING: N
SHORTNESS OF BREATH: N
LOSS OF VISION: N
AGITATION: N
JOINT SWELLING: N
EXCESSIVE HAIR LOSS (MORE THAN YOUR NORM): N
MUSCLE WEAKNESS: N
SEIZURES: N
FEVER: N
UNEXPLAINED HEARING LOSS: N
SWOLLEN LEGS OR FEET: Y
LOSS OF CONSCIOUSNESS: N
UNUSUAL BLEEDING: N
PAIN OR BURNING ON URINATION: N
COUGH: N
MEMORY LOSS: N
JAUNDICE: N
ANXIETY: N
EASILY LOSING TEMPER: N
ABNORMAL URINE: N
HEADACHES: N
CHEST PAIN: N
STOMACH PAIN: N
HOW WOULD YOU DESCRIBE YOUR STIFFNESS ON AVERAGE: MODERATE
FAINTING: N
VAGINAL DRYNESS: N
ANEMIA: N
SKIN REDNESS: N
NUMBNESS OR TINGLING IN HANDS OR FEET: N
SKIN TIGHTNESS: N
RASH: N
RASH OR ULCERS: N
DOUBLE OR BLURRED VISION: N
DIFFICULTY BREATHING LYING DOWN: N
EYE DRYNESS: N
DIFFICULTY STAYING ASLEEP: N
DIFFICULTY FALLING ASLEEP: N
HEARTBURN OR REFLUX: N
HOARSE VOICE: N
NAUSEA: Y
VOMITING OF BLOOD OR COFFEE GROUND CONSISTENCY MATERIAL: N
UNUSUAL FATIGUE: N
JOINT PAIN: Y
UNUSUALLY RAPID OR SLOWED HEART RATE: N
COLOR CHANGES OF HANDS OR FEET IN THE COLD: N
BLACK STOOLS: N
MORNING STIFFNESS: Y
SUN SENSITIVE (SUN ALLERGY): N
SORES IN MOUTH OR NOSE: N
DIFFICULTY SWALLOWING: N
MORNING STIFFNESS IN LOWER BACK: Y
DRYNESS OF MOUTH: N
PERSISTENT DIARRHEA: N
UNEXPLAINED WEIGHT CHANGE: Y
BEHAVIORAL CHANGES: N

## 2024-08-07 ENCOUNTER — LAB SERVICES (OUTPATIENT)
Dept: LAB | Age: 24
End: 2024-08-07

## 2024-08-07 ENCOUNTER — APPOINTMENT (OUTPATIENT)
Dept: RHEUMATOLOGY | Age: 24
End: 2024-08-07
Attending: NURSE PRACTITIONER

## 2024-08-07 VITALS
WEIGHT: 150 LBS | TEMPERATURE: 98.6 F | DIASTOLIC BLOOD PRESSURE: 70 MMHG | BODY MASS INDEX: 25.61 KG/M2 | HEIGHT: 64 IN | RESPIRATION RATE: 16 BRPM | SYSTOLIC BLOOD PRESSURE: 100 MMHG | HEART RATE: 72 BPM

## 2024-08-07 DIAGNOSIS — G89.29 CHRONIC PAIN OF BOTH KNEES: ICD-10-CM

## 2024-08-07 DIAGNOSIS — M25.551 BILATERAL HIP PAIN: ICD-10-CM

## 2024-08-07 DIAGNOSIS — M25.50 POLYARTHRALGIA: Primary | ICD-10-CM

## 2024-08-07 DIAGNOSIS — M25.50 POLYARTHRALGIA: ICD-10-CM

## 2024-08-07 DIAGNOSIS — M25.552 BILATERAL HIP PAIN: ICD-10-CM

## 2024-08-07 DIAGNOSIS — M25.561 CHRONIC PAIN OF BOTH KNEES: ICD-10-CM

## 2024-08-07 DIAGNOSIS — M25.562 CHRONIC PAIN OF BOTH KNEES: ICD-10-CM

## 2024-08-07 PROCEDURE — 86200 CCP ANTIBODY: CPT | Performed by: CLINICAL MEDICAL LABORATORY

## 2024-08-07 PROCEDURE — 86038 ANTINUCLEAR ANTIBODIES: CPT | Performed by: CLINICAL MEDICAL LABORATORY

## 2024-08-07 PROCEDURE — 36415 COLL VENOUS BLD VENIPUNCTURE: CPT | Performed by: INTERNAL MEDICINE

## 2024-08-07 PROCEDURE — 99204 OFFICE O/P NEW MOD 45 MIN: CPT | Performed by: INTERNAL MEDICINE

## 2024-08-07 ASSESSMENT — PAIN SCALES - GENERAL: PAINLEVEL: 0

## 2024-08-08 ENCOUNTER — TELEPHONE (OUTPATIENT)
Dept: GASTROENTEROLOGY | Age: 24
End: 2024-08-08

## 2024-08-08 DIAGNOSIS — F12.90 MARIJUANA USER: ICD-10-CM

## 2024-08-08 DIAGNOSIS — R11.0 NAUSEA WITHOUT VOMITING: ICD-10-CM

## 2024-08-08 DIAGNOSIS — R10.31 BILATERAL LOWER ABDOMINAL PAIN: ICD-10-CM

## 2024-08-08 DIAGNOSIS — K21.9 GERD WITHOUT ESOPHAGITIS: ICD-10-CM

## 2024-08-08 DIAGNOSIS — R10.13 EPIGASTRIC PAIN: Primary | ICD-10-CM

## 2024-08-08 DIAGNOSIS — R14.0 ABDOMINAL BLOATING: ICD-10-CM

## 2024-08-08 DIAGNOSIS — R10.32 BILATERAL LOWER ABDOMINAL PAIN: ICD-10-CM

## 2024-08-08 LAB — ANA SER QL IA: NEGATIVE

## 2024-08-09 ENCOUNTER — TELEPHONE (OUTPATIENT)
Dept: RHEUMATOLOGY | Age: 24
End: 2024-08-09

## 2024-08-09 LAB — CCP AB SER IA-ACNC: 2 UNITS

## 2025-01-16 SDOH — ECONOMIC STABILITY: TRANSPORTATION INSECURITY
IN THE PAST 12 MONTHS, HAS LACK OF RELIABLE TRANSPORTATION KEPT YOU FROM MEDICAL APPOINTMENTS, MEETINGS, WORK OR FROM GETTING THINGS NEEDED FOR DAILY LIVING?: YES

## 2025-01-16 SDOH — ECONOMIC STABILITY: FOOD INSECURITY: WITHIN THE PAST 12 MONTHS, THE FOOD YOU BOUGHT JUST DIDN'T LAST AND YOU DIDN'T HAVE MONEY TO GET MORE.: NEVER TRUE

## 2025-01-16 SDOH — ECONOMIC STABILITY: HOUSING INSECURITY: WHAT IS YOUR LIVING SITUATION TODAY?: I HAVE A STEADY PLACE TO LIVE

## 2025-01-16 SDOH — ECONOMIC STABILITY: HOUSING INSECURITY: DO YOU HAVE PROBLEMS WITH ANY OF THE FOLLOWING?: NONE OF THE ABOVE

## 2025-01-16 SDOH — ECONOMIC STABILITY: GENERAL: WOULD YOU LIKE HELP WITH ANY OF THE FOLLOWING NEEDS?: I DON'T WANT HELP WITH ANY OF THESE

## 2025-01-16 ASSESSMENT — SOCIAL DETERMINANTS OF HEALTH (SDOH): IN THE PAST 12 MONTHS, HAS THE ELECTRIC, GAS, OIL, OR WATER COMPANY THREATENED TO SHUT OFF SERVICE IN YOUR HOME?: NO

## 2025-01-17 ENCOUNTER — LAB SERVICES (OUTPATIENT)
Dept: LAB | Age: 25
End: 2025-01-17

## 2025-01-17 ENCOUNTER — OFFICE VISIT (OUTPATIENT)
Dept: OBGYN | Age: 25
End: 2025-01-17

## 2025-01-17 VITALS
HEIGHT: 64 IN | DIASTOLIC BLOOD PRESSURE: 68 MMHG | HEART RATE: 71 BPM | SYSTOLIC BLOOD PRESSURE: 108 MMHG | OXYGEN SATURATION: 100 % | BODY MASS INDEX: 25.78 KG/M2 | WEIGHT: 151.01 LBS

## 2025-01-17 DIAGNOSIS — Z11.3 ROUTINE SCREENING FOR STI (SEXUALLY TRANSMITTED INFECTION): ICD-10-CM

## 2025-01-17 DIAGNOSIS — R82.90 CLOUDY URINE: Primary | ICD-10-CM

## 2025-01-17 LAB
ANNOTATION COMMENT IMP: NORMAL
APPEARANCE, POC: ABNORMAL
BILIRUB UR QL STRIP: NEGATIVE
COLOR UR: YELLOW
GLUCOSE UR-MCNC: NEGATIVE MG/DL
HAV IGM SER QL: NEGATIVE
HBV CORE IGM SER QL: NEGATIVE
HBV SURFACE AG SER QL: NEGATIVE
HCV AB SER QL: NEGATIVE
HGB UR QL STRIP: ABNORMAL
HIV 1+2 AB+HIV1 P24 AG SERPL QL IA: NONREACTIVE
IMP & REVIEW OF LAB RESULTS: NORMAL
KETONES UR STRIP-MCNC: NEGATIVE MG/DL
LEUKOCYTE ESTERASE UR QL STRIP: ABNORMAL
NITRITE UR QL STRIP: NEGATIVE
PH UR: 6 [PH] (ref 5–7)
PROT UR-MCNC: NEGATIVE MG/DL
SP GR UR: 1.02 (ref 1–1.03)
UROBILINOGEN UR-MCNC: 0.2 MG/DL (ref 0–1)

## 2025-01-17 PROCEDURE — 87389 HIV-1 AG W/HIV-1&-2 AB AG IA: CPT | Performed by: CLINICAL MEDICAL LABORATORY

## 2025-01-17 PROCEDURE — 86592 SYPHILIS TEST NON-TREP QUAL: CPT | Performed by: CLINICAL MEDICAL LABORATORY

## 2025-01-17 PROCEDURE — 80074 ACUTE HEPATITIS PANEL: CPT | Performed by: CLINICAL MEDICAL LABORATORY

## 2025-01-17 PROCEDURE — 36415 COLL VENOUS BLD VENIPUNCTURE: CPT | Performed by: NURSE PRACTITIONER

## 2025-01-17 ASSESSMENT — ENCOUNTER SYMPTOMS
PSYCHIATRIC NEGATIVE: 1
ROS GI COMMENTS: CLOUDY URINE
CONSTITUTIONAL NEGATIVE: 1

## 2025-01-18 LAB — RPR SER QL: NONREACTIVE

## 2025-01-19 LAB — BACTERIA UR CULT: ABNORMAL

## 2025-01-20 LAB
BACTERIAL VAGINOSIS VAG-IMP: NOT DETECTED
C ALBICANS DNA VAG QL NAA+PROBE: POSITIVE
C GLABRATA DNA VAG QL NAA+PROBE: NOT DETECTED
C TRACH RRNA CVX QL NAA+PROBE: NEGATIVE
Lab: NORMAL
N GONORRHOEA RRNA CVX QL NAA+PROBE: NEGATIVE
SERVICE CMNT-IMP: ABNORMAL
SERVICE CMNT-IMP: NORMAL
T VAGINALIS DNA VAG QL NAA+PROBE: NOT DETECTED

## 2025-02-01 ENCOUNTER — APPOINTMENT (OUTPATIENT)
Dept: OBGYN | Age: 25
End: 2025-02-01

## 2025-02-17 ENCOUNTER — E-ADVICE (OUTPATIENT)
Dept: OBGYN | Age: 25
End: 2025-02-17

## 2025-02-17 DIAGNOSIS — B37.9 YEAST INFECTION: ICD-10-CM

## 2025-02-17 RX ORDER — FLUCONAZOLE 150 MG/1
TABLET ORAL
Qty: 2 TABLET | Refills: 0 | Status: SHIPPED | OUTPATIENT
Start: 2025-02-17

## (undated) NOTE — LETTER
12/06/21    Dear Dr. Beatriz Rodriguez      Thank you for referring your patient, Angeles Negron to me for an evaluation. Please see my initial consult note enclosed below. Let me know if you have any questions.     Thank you  Suezanne Fothergill, MD, Catherine Zarate quickly. When walking, she feels off balance but denies any falls. She denies issues with concentration; no episodes of loss of of awareness or prior history of seizures; she denies history of migraines but admits this is her 3rd concussion.   She had one albuterol 108 (90 Base) MCG/ACT Inhalation Aero Soln Inhale 2 puffs into the lungs. • JUNEL FE 24 1-20 MG-MCG(24) Oral Tab Take 1 tablet by mouth daily. ROS:   A comprehensive 10 point review of systems was completed.   Pertinent positives an symmetrically with midline uvula  Spinal accessory:   Shoulder Shrug: normal bilaterally   Lateral head turn: normal bilaterally  Hypoglossal:   Tongue movement: protrusion is midline with normal lateral movements    Motor System:  Strength: 5/5 throughout 2/2 delayed recall 2/5, and orientation 5/6), with main deficits in the area of language as well as memory. Patient had a fall and struck the back of her head 11/3/2021.   She immediately had dizziness, as well as nausea, and subsequently developed freq abortive therapy, she was advised to trial sumatriptan as follows:   take sumatriptan 50 mg (Imitrex) within first 30 minutes of symptoms starting; if not improved after 2 hours, take additional dose, and then stop taking; monitor for chest pain / tightnes

## (undated) NOTE — LETTER
Date: 11/5/2021    Patient Name: Jovita Kwon          To Whom it may concern: This letter has been written at the patient's request. The above patient was seen at the [facility] for treatment of a medical condition.     This patient

## (undated) NOTE — LETTER
Date & Time: 11/3/2021, 2:32 PM  Patient: Jaylin Diaz  Encounter Provider(s):    ROGER Akhtar       To Whom It May Concern:    Sergio Singh was seen and treated in our department on 11/3/2021.  She should not return to work

## (undated) NOTE — LETTER
Date: 12/6/2021    Patient Name: Mynor Frankel          To Whom it may concern: This letter has been written at the patient's request. The above patient was seen at the [facility] for treatment of a medical condition.     This keyshawn

## (undated) NOTE — LETTER
Date: 11/22/2021    Patient Name: Noy Youngblood          To Whom it may concern: This letter has been written at the patient's request. The above patient was seen at the [facility] for treatment of a medical condition.     This patient

## (undated) NOTE — LETTER
Date: 12/6/2021    Patient Name: Teddy Rudd          To Whom it may concern: This letter has been written at the patient's request. The above patient was seen at the [facility] for treatment of a medical condition.     This patient

## (undated) NOTE — LETTER
12/06/21    Date: 12/6/2021    Patient Name: Aurelia Milligan          To Whom it may concern: This letter has been written at the patient's request. The above patient was seen at the [facility] for treatment of a medical condition.     Dayna Fernandez

## (undated) NOTE — LETTER
12/06/21    Date: 12/6/2021    Patient Name: Ignacio Murray          To Whom it may concern: This letter has been written at the patient's request. The above patient was seen at the [facility] for treatment of a medical condition.     Mika Garcia